# Patient Record
(demographics unavailable — no encounter records)

---

## 2024-10-29 NOTE — ASSESSMENT
[FreeTextEntry1] : 71 y/o female with PMHx PE on Eliquis, AFib, HTN, Hypothyroidism, PreDM, AS s/p TAVR (2/2024) presenting to clinic today for a follow up visit for the evaluation of cough. Today pt c/o of SOB for 2 weeks a/w the cough. The patient was last seen in july, 2024 for the cough and was started on Flonase.   #Cgronic cough  #Cough variant asthma -Now a/w SOB - likely from PND vs GERD vs Asthma - started on Flonase -PFT reviewed from 02/2024 - as the symptoms have not improved, will start on Albuterol inhalers prn  #Hx of DVT #Hx of Massive PE S/P TPA & Thrombectomy (2022) on Eliquis #Hx of Mild Pulmonary Hypertension #Hx of AFib on Rate Control and Eliquis - CT Chest (4/8/24) revealed decreasing pleural effusion on the left compared to prior from 3/26/24 - XR Chest (4/11/24) revealed stable left sided opacities and elevated left hemidiaphragm - following with cardiology - Pt is on Lasix 40mg QD - C/w Eliquis 5mg BID (Plan to continue same dose lifelong per Heme)  RTC in 3 months/PRN.

## 2024-10-29 NOTE — HISTORY OF PRESENT ILLNESS
[Former] : former [TextBox_4] : 71 y/o female with PMHx PE on Eliquis, AFib, HTN, Hypothyroidism, PreDM, AS s/p TAVR (2/2024) presenting to clinic today for a follow up visit for the evaluation of cough. Today pt c/o of SOB for 2 weeks a/w the cough. The patient was last seen in july, 2024 for the cough and was started on Flonase. The patient is also on Omeprazole for GERD.

## 2024-10-29 NOTE — PHYSICAL EXAM
[No Resp Distress] : no resp distress [Clear to Auscultation Bilaterally] : clear to auscultation bilaterally [No Acute Distress] : no acute distress [Normal Oropharynx] : normal oropharynx [Normal Rate/Rhythm] : normal rate/rhythm [Normal S1, S2] : normal s1, s2 [No Clubbing] : no clubbing [Oriented x3] : oriented x3

## 2024-10-29 NOTE — REVIEW OF SYSTEMS
[Cough] : cough [Negative] : Constitutional [Dyspnea] : dyspnea [Sputum] : no sputum [Wheezing] : no wheezing [SOB on Exertion] : no sob on exertion

## 2024-11-11 NOTE — REASON FOR VISIT
[FreeTextEntry1] : Presents with son.  Proceeding with hip replacement next month.  Lost about 15 pounds since last office visit, however, reports lower extremity edema a bit worse this week.  Breathing relatively comfortable.  No interval cardiac symptoms.

## 2024-11-11 NOTE — DISCUSSION/SUMMARY
[EKG obtained to assist in diagnosis and management of assessed problem(s)] : EKG obtained to assist in diagnosis and management of assessed problem(s) [FreeTextEntry1] : Double Torsemide x 3-days, then resume 20mg daily Continue Eliquis Continue diltiazem Follow-up 4 months

## 2024-11-11 NOTE — PHYSICAL EXAM
[de-identified] : Obese, no distress [de-identified] : Irregularly irregular, normal S1-S2, 2/6 systolic ejection murmur, no rub or gallop [de-identified] : Decreased left lower breath sounds, otherwise clear [de-identified] : Warm, 1-2+ [de-identified] : Alert, normal affect, logical conversation

## 2024-11-11 NOTE — ASSESSMENT
[FreeTextEntry1] : Severe bicuspid AS / aortopathy s/p SAVR (23 mm Honeycutt Inspiris) + ascending aortic aneurysm repair. Normal bioprosthetic AV function  Chronic diastolic CHF Chronic volume overload improving (obesity / sedentary / venous insufficiency contributing to LE edema)  Rate controlled [id number] = 4  Recurrent DVT / PE (post-op)  Pericardial effusion (post-op / improved)  BP controlled

## 2024-11-19 NOTE — ASSESSMENT
[FreeTextEntry1] : #Preop clearance visit - can bridge eliquis with lovenox and stop 12hrs preop RCRI:  Operative risk: Intermediate SP MICHELLE bioprthestitic Patient risk: High risk (bleeding PE and AVR) can continue cardizem for Afib stop SGLT2i if any 3 days pre op stop ACE ARB 1-day preop high perioperative pending PSTevalneeds cardiology risk assessment with dr armenta  #Severe AS SP AVR bioprothesis in Feb 2024 #AAA #HFpEF, Afib - s/p Biobental on 2/20/2024. - On eliquis  - c/w cardizem - c/w torsemide - follows with [facility] Rate controlled [id number] = 4  #Intermittent Rectal bleeding - On eliquis  - BRBPR - strains sometimes - GI: likely hemorrhoidal, pt will f/u with them for endoscopy and will see colorectal surgeon  # DVT and PE May 2022 -c/w eliquis  - had severe PE, went to ER, SP thrombectomy and placed on eliquis since this - can consider stopping  #Lumbar/ Hip degenerative disease #Lumbar radiculopathy #Left sciatic Nerve pain 2/2 chronic back pain #BL knee OA - Chronic back pain with left sided gluteal pain radiating down left leg posterior - Difficulty walking and completing ADLs. Uses a walker - Seeing pain management. S/p multiple steroid injections. - continue gabapentin 300 mg BID - C/w treatment with pain management and ortho - avoid NSAIDs - Tylenol PRN for pain - planned for BL hip replacements with ortho   #DM - pre DM range now - follow every 3-6 months - counseled on low carb diet  # pending PST for surgery, not clearly clear by cardiology yet will revise post PST

## 2024-11-19 NOTE — HISTORY OF PRESENT ILLNESS
[FreeTextEntry1] : Preop clearannce [de-identified] : 71-year F with a PMHx of Afib on Eliquis PE (thrombectomy)/ DVT (RLE) 5/2022 on eliquis, HTN, spinal stenosis, pulmonary HTN, OA of bilateral hips, Ascending Aortic aneurysm (4.2cm), Severe AS s/p AVR bioprosthetic , GERD presents for follow up. She complains of acid reflux and dry cough.  scheduled for left hip replacement by [facility] in mid December for preop clearance

## 2024-11-19 NOTE — PHYSICAL EXAM
[No Acute Distress] : no acute distress [Well Nourished] : well nourished [Normal Sclera/Conjunctiva] : normal sclera/conjunctiva [Normal Outer Ear/Nose] : the outer ears and nose were normal in appearance [No JVD] : no jugular venous distention [No Lymphadenopathy] : no lymphadenopathy [Supple] : supple [No Respiratory Distress] : no respiratory distress  [No Accessory Muscle Use] : no accessory muscle use [Normal Rate] : normal rate  [Regular Rhythm] : with a regular rhythm [Soft] : abdomen soft [Non Tender] : non-tender [No HSM] : no HSM [Normal Posterior Cervical Nodes] : no posterior cervical lymphadenopathy [Normal Anterior Cervical Nodes] : no anterior cervical lymphadenopathy [No CVA Tenderness] : no CVA  tenderness [No Spinal Tenderness] : no spinal tenderness [No Joint Swelling] : no joint swelling [Grossly Normal Strength/Tone] : grossly normal strength/tone [No Rash] : no rash [Coordination Grossly Intact] : coordination grossly intact [Normal Affect] : the affect was normal [Normal Insight/Judgement] : insight and judgment were intact [de-identified] : Decreased left BS [de-identified] : Metallic s2 [de-identified] : Walking with a walker, abnormal gait Severe OA [de-identified] : LE edema

## 2024-11-19 NOTE — REVIEW OF SYSTEMS
[Lower Ext Edema] : lower extremity edema [Cough] : cough [Joint Pain] : joint pain [Muscle Pain] : muscle pain [Negative] : Genitourinary [Fever] : no fever [Chills] : no chills [Discharge] : no discharge [Vision Problems] : no vision problems [Earache] : no earache [Nasal Discharge] : no nasal discharge [Chest Pain] : no chest pain [Palpitations] : no palpitations [Orthopnea] : no orthopnea [Abdominal Pain] : no abdominal pain [Nausea] : no nausea [Constipation] : no constipation [Diarrhea] : diarrhea [Vomiting] : no vomiting [Dysuria] : no dysuria [Hematuria] : no hematuria [Headache] : no headache [Memory Loss] : no memory loss [FreeTextEntry7] : BRBPR

## 2025-01-13 NOTE — HISTORY OF PRESENT ILLNESS
[FreeTextEntry1] : Patient is a 71-year-old female with a PMHx of HTN, diabetes, DVT, PD on Eliquis, spinal stenosis, pulmonary hypertension, osteoarthritis of the hips, ascending aortic aneurysm, aortic stenosis s/p aortic valve repairwho presents for evaluation of sigmoid colon cancer. After the patient's last office visit, she was admitted to [facility] with bleeding per rectum. She underwent evaluation and was found to have a sigmoid colon mass. Patient had a colonoscopy on December 20th, 2024. This showed multiple polyps, as well as a large mass in the sigmoid colon. Pathology showed tubular adenomas and the mass biopsies came back as invasive, moderately differentiated adenocarcinoma. Patient had a CT chest adenopelvis that showed no evidence of metastatic disease. Patient presents today to discuss surgery. She states overall she feels well, but she continues to have blood in her bowel movements. She has bowel movements one to two times per day. She denies recent fevers, chills, nausea, or vomiting. She denies a family history of colon cancer, rectal cancer, or inflammatory bowel disease, and she has not previously had a colonoscopy.

## 2025-01-13 NOTE — ASSESSMENT
[FreeTextEntry1] : 71-year-old female, sigmoid colon adenocarcinoma.  I spoke to the patient regarding her condition, I recommended robot assisted laparoscopic sigmoidectomy, possible open, possible ostomy. All risk benefits and all terms were discussed, including bleeding, infection, damage to surrounding structures, anastomotic stricture, anastomotic leak, cardiopulmonary events, venous thromboembolic events, hernia formation, neuropathy. We expect a 3 to 7 day hospital stay and 6 to 8 week recovery. Patient expressed understanding and was in agreement with the plan.

## 2025-01-13 NOTE — PHYSICAL EXAM
[FreeTextEntry1] : General: awake, alert, no acute distress. Respiratory: normal respiratory effort. Abdomen: soft, non-tender, non-distended.

## 2025-01-13 NOTE — HISTORY OF PRESENT ILLNESS
[FreeTextEntry1] : Patient is a 71-year-old female with a PMHx of HTN, diabetes, DVT, PD on Eliquis, spinal stenosis, pulmonary hypertension, osteoarthritis of the hips, ascending aortic aneurysm, aortic stenosis s/p aortic valve repairwho presents for evaluation of sigmoid colon cancer. After the patient's last office visit, she was admitted to [facility] with bleeding per rectum. She underwent evaluation and was found to have a sigmoid colon mass. Patient had a colonoscopy on December 20th, 2024. This showed multiple polyps, as well as a large mass in the sigmoid colon. Pathology showed tubular adenomas and the mass biopsies came back as invasive, moderately differentiated adenocarcinoma. Patient had a CT chest adenopelvis that showed no evidence of metastatic disease. Patient presents today to discuss surgery. She states overall she feels well, but she continues to have blood in her bowel movements. She has bowel movements one to two times per day. She denies recent fevers, chills, nausea, or vomiting. She denies a family history of colon cancer, rectal cancer, or inflammatory bowel disease, and she has not previously had a colonoscopy.

## 2025-01-13 NOTE — END OF VISIT
[FreeTextEntry3] : All medical record entries made by Karla Pina (Scribe) were at my, Dr. Raz Encinas, direction and personally dictated by me on 01/08/2025. I have reviewed the chart and agree that the record accurately reflects my personal performance of the history, physical exam, assessment, and plan. I have also personally directed, reviewed, and agreed with the chart.All medical record entries made by Karla Pina (Scribe) were at , Dr. Arely Smith, direction and personally dictated by me on 01/08/2025. I have reviewed the chart and agree that the record accurately reflects my personal performance of the history, physical exam, assessment, and plan. I have also personally directed, reviewed, and agreed with the chart.

## 2025-02-05 NOTE — ASSESSMENT
[FreeTextEntry1] : 71-year-old female, s/p robotic sigmoidectomy for T3N0 sigmoid adenocarcinoma.   The patient is recovering well from surgery. I recommended she return to a high-fiber diet and light physical activity. We will refer her to Dr. Marie, for evaluation. We will see her back in 3 months.

## 2025-02-05 NOTE — HISTORY OF PRESENT ILLNESS
[FreeTextEntry1] : Patient is a 71-year-old female with a PMHx of HTN, diabetes, DVT, PD on Eliquis, spinal stenosis, pulmonary hypertension, osteoarthritis of the hips, ascending aortic aneurysm, aortic stenosis s/p aortic valve repair who presents for a post-operative office visit, s/p robotic sigmoidectomy on January 17, 2025. Pathology showed T3N0 (0/18) moderately differentiated adenocarcinoma. Since the procedure, the patient states she feels well. She's tolerating a diet with regular bowel movements. She reports mild abdominal soreness. She denies recent fevers, chills, nausea, or vomiting. She denies a family history of colon cancer, rectal cancer, or inflammatory bowel disease, and she has not previously had a colonoscopy.     Previous History: After the patient's last office visit, she was admitted to [facility] with bleeding per rectum. She underwent evaluation and was found to have a sigmoid colon mass. Patient had a colonoscopy on December 20th, 2024. This showed multiple polyps, as well as a large mass in the sigmoid colon. Pathology showed tubular adenomas and the mass biopsies came back as invasive, moderately differentiated adenocarcinoma. Patient had a CT chest, abdomen, and pelvis that showed no evidence of metastatic disease.

## 2025-02-05 NOTE — END OF VISIT
[FreeTextEntry3] : Documented by Hoa Brown acting as a scribe for Raz Encinas on 02/04/2025   All medical record entries made by the Scribe were at my, Dr. Raz Encinas direction and personally dictated by me on 02/04/2025 . I have reviewed the chart and agree that the record accurately reflects my personal performance of the history, physical exam, assessment and plan. I have also personally directed, reviewed, and agreed with the chart.

## 2025-02-05 NOTE — CONSULT LETTER
[Dear  ___] : Dear  [unfilled], [Consult Letter:] : I had the pleasure of evaluating your patient, [unfilled]. [FreeTextEntry1] : Sincerely,  Raz Encinas MD, Colon and Rectal Surgery Director of Colon and Rectal Surgery  [employer] at [address], [address] [place] [address] Tel [phone] ext [phone] Fax [phone]

## 2025-02-05 NOTE — CONSULT LETTER
[Dear  ___] : Dear  [unfilled], [Consult Letter:] : I had the pleasure of evaluating your patient, [unfilled]. [FreeTextEntry1] : Sincerely,  Raz Encinas MD, Colon and Rectal Surgery Director of Colon and Rectal Surgery  [employer] at [address], [address] [place] [address] Tel [phone] ext [phone] Fax [phone]

## 2025-02-05 NOTE — HISTORY OF PRESENT ILLNESS
[FreeTextEntry1] : Patient is a 71-year-old female with a PMHx of HTN, diabetes, DVT, PD on Eliquis, spinal stenosis, pulmonary hypertension, osteoarthritis of the hips, ascending aortic aneurysm, aortic stenosis s/p aortic valve repair who presents for a post-operative office visit, s/p robotic sigmoidectomy on January 17, 2025. Pathology showed T3N0 (0/18) moderately differentiated adenocarcinoma. Since the procedure, the patient states she feels well. She's tolerating a diet with regular bowel movements. She reports mild abdominal soreness. She denies recent fevers, chills, nausea, or vomiting. She denies a family history of colon cancer, rectal cancer, or inflammatory bowel disease, and she has not previously had a colonoscopy.     Previous History: After the patient's last office visit, she was admitted to [facility] with bleeding per rectum. She underwent evaluation and was found to have a sigmoid colon mass. Patient had a colonoscopy on December 20th, 2024. This showed multiple polyps, as well as a large mass in the sigmoid colon. Pathology showed tubular adenomas and the mass biopsies came back as invasive, moderately differentiated adenocarcinoma. Patient had a CT chest, abdomen, and pelvis that showed no evidence of metastatic disease.

## 2025-02-05 NOTE — ADDENDUM
[FreeTextEntry1] :  I, Hoa Brown ([facility]) assisted in filling out this chart under the dictation of Raz Encinas on 02/04/2025

## 2025-02-05 NOTE — PHYSICAL EXAM
[FreeTextEntry1] : General: Awake, Alert, No Acute Distress Respiratory: Normal Respiratory Effort Abdomen: Soft, non-tender, non-distended, well-healing port sites, and pfannenstiel incision. No evidence of erythema, induration, or prurulence.

## 2025-02-19 NOTE — PHYSICAL EXAM
[Ambulatory and capable of all self care but unable to carry out any work activities] : Status 2- Ambulatory and capable of all self care but unable to carry out any work activities. Up and about more than 50% of waking hours [Normal] : affect appropriate [de-identified] : s/p surgery,well healed surgical incisions

## 2025-02-19 NOTE — END OF VISIT
[FreeTextEntry3] : I was physically present for the key portions of the evaluation and management service provided. I agree with the history and physical, and plan which I have reviewed and edited where appropriate.\par

## 2025-02-19 NOTE — HISTORY OF PRESENT ILLNESS
[de-identified] : 69 Female with spinal stenosis, and HTN diagnosed with massive PE early June 2022. \par  \par  She presented with sob.\par  CTA found pulmonary emboli noted in the bilateral main pulmonary arteries with extension to the bilateral segmental and subsegmental branches. Right heart\par  strain was also noted. \par  \par  tPA was given and sent to IR was intubated in IR suite, IR preformed successful thrombectomy, o2 sats improved after thrombectomy. patient kept intubated and transferred to ICU. Patient awakened in ICU, mental status preserved, extubated and hemodynamically stable.\par  patient had bleeding at puncture site,was given two units of pRBC. \par  The patient also had a LE duplex done which showed acute deep vein thrombosis of the right distal femoral, popliteal, bilateral gastrocnemius sinuses and left\par  peroneal veins. She was started on a heparin drip. \par  \par  she was discharged on eliquis  for  PE. \par  \par  Pt is not very ambulatory d/t hip pain and knee pain and spinal stenosis. Unfortunately she is not being allowed pain meds i.e NSAIDS as she is on AC\par  \par  Does  not have any bleeding.  [de-identified] : 2/9/23 Patient is following up for PE and anemia. She feels well except for bilateral hip and knee pain, on Tylenol as needed. She used to see pain management but was discharged by her MD. Now she is seeing ortho Dr Prado, MRI of the hips was done and was recommended to do PT twice a week, waiting for her schedule to start. She is on Gabapentin 300 mg twice daily and her low back pain and sciatica has improved. She denies shortness of breath, chills, palpitations or bleeding.  She has not done colonoscopy. She is UTD with PCP and gyne Dr Salter. She is compliant with Eliquis 2/18/25 Pt is here for follow up.She was diagnosed with colon cancer in 12/24. Patient had a colonoscopy on December 20th, 2024. This showed multiple polyps, as well as a large mass in the sigmoid colon. Pathology showed tubular adenomas and the mass biopsies came back as invasive, moderately differentiated adenocarcinoma. Patient had a CT chest, abdomen, and pelvis that showed no evidence of metastatic disease. She underwent  robotic sigmoidectomy on January 17, 2025. Pathology showed T3N0 (0/18) moderately differentiated adenocarcinoma.  Pt denies any post op complications. states she recd blood transfusions and IV iron while hospitalized.  Since the procedure, the patient states she feels well. She's tolerating a diet with regular bowel movements.. She denies recent fevers, chills, nausea, or vomiting. She denies a family history of colon cancer, rectal cancer, or inflammatory bowel disease, and she has not previously had a colonoscopy.

## 2025-02-19 NOTE — ASSESSMENT
[FreeTextEntry1] : In summary this is a 71 year old woman with obesity, osteoarthritis, symptomatic massive PE and DVT now diagnosed with Stage II [id number] colon cancer s/p sigmoid resection with clear margins   Thrombophilia work up on 1/11/23 was unremarkable.  RECOMMENDATIONS Previous notes reviewed and all relevant laboratory results discussed with and were communicated to the patient. I have reviewed the pathology and staging with pt and her son at length. She has early stage Colon cancer. The only poor prognostic factor noted is LVI. I discussed the adjuvant therapy that could be offered. However with pt's performance status, she will likely not tolerate chemotherapy well. I discussed this at length with them and they were in agreement with proceeding with close surveillance. I have ordered CBC, CMP, CEA, iron profile and ctDNA ( signatera) She will need a repeat colonoscopy in 1 year. Imaging will be ordered in 6 months  #Pulmonary embolism - Continue Eliquis - Discussed with pt that she had a very symptomatic PE and my preference would be continue indefinite AC. In terms of giving her a lower dose, she is obese and again I would not recommend reduction of the Eliquis dose.   #H/o anemia - Will check iron panel and treat with IV iron if needed RTC in 1month

## 2025-02-19 NOTE — REVIEW OF SYSTEMS
[Joint Pain] : joint pain [Joint Stiffness] : joint stiffness [Muscle Pain] : muscle pain [Negative] : Allergic/Immunologic [FreeTextEntry2] : no colonoscopy done, Mammogram in 2021 NEG, GYN eval recently

## 2025-02-19 NOTE — HISTORY OF PRESENT ILLNESS
[de-identified] : 69 Female with spinal stenosis, and HTN diagnosed with massive PE early June 2022. \par  \par  She presented with sob.\par  CTA found pulmonary emboli noted in the bilateral main pulmonary arteries with extension to the bilateral segmental and subsegmental branches. Right heart\par  strain was also noted. \par  \par  tPA was given and sent to IR was intubated in IR suite, IR preformed successful thrombectomy, o2 sats improved after thrombectomy. patient kept intubated and transferred to ICU. Patient awakened in ICU, mental status preserved, extubated and hemodynamically stable.\par  patient had bleeding at puncture site,was given two units of pRBC. \par  The patient also had a LE duplex done which showed acute deep vein thrombosis of the right distal femoral, popliteal, bilateral gastrocnemius sinuses and left\par  peroneal veins. She was started on a heparin drip. \par  \par  she was discharged on eliquis  for  PE. \par  \par  Pt is not very ambulatory d/t hip pain and knee pain and spinal stenosis. Unfortunately she is not being allowed pain meds i.e NSAIDS as she is on AC\par  \par  Does  not have any bleeding.  [de-identified] : 2/9/23 Patient is following up for PE and anemia. She feels well except for bilateral hip and knee pain, on Tylenol as needed. She used to see pain management but was discharged by her MD. Now she is seeing ortho Dr Prado, MRI of the hips was done and was recommended to do PT twice a week, waiting for her schedule to start. She is on Gabapentin 300 mg twice daily and her low back pain and sciatica has improved. She denies shortness of breath, chills, palpitations or bleeding.  She has not done colonoscopy. She is UTD with PCP and gyne Dr Salter. She is compliant with Eliquis 2/18/25 Pt is here for follow up.She was diagnosed with colon cancer in 12/24. Patient had a colonoscopy on December 20th, 2024. This showed multiple polyps, as well as a large mass in the sigmoid colon. Pathology showed tubular adenomas and the mass biopsies came back as invasive, moderately differentiated adenocarcinoma. Patient had a CT chest, abdomen, and pelvis that showed no evidence of metastatic disease. She underwent  robotic sigmoidectomy on January 17, 2025. Pathology showed T3N0 (0/18) moderately differentiated adenocarcinoma.  Pt denies any post op complications. states she recd blood transfusions and IV iron while hospitalized.  Since the procedure, the patient states she feels well. She's tolerating a diet with regular bowel movements.. She denies recent fevers, chills, nausea, or vomiting. She denies a family history of colon cancer, rectal cancer, or inflammatory bowel disease, and she has not previously had a colonoscopy.

## 2025-02-19 NOTE — PHYSICAL EXAM
[Ambulatory and capable of all self care but unable to carry out any work activities] : Status 2- Ambulatory and capable of all self care but unable to carry out any work activities. Up and about more than 50% of waking hours [Normal] : affect appropriate [de-identified] : s/p surgery,well healed surgical incisions

## 2025-02-19 NOTE — ASSESSMENT
[FreeTextEntry1] : In summary this is a 71 year old woman with obesity, osteoarthritis, symptomatic massive PE and DVT now diagnosed with Stage II [id number] colon cancer s/p sigmoid resection with clear margins   Thrombophilia work up on 1/11/23 was unremarkable.  RECOMMENDATIONS Previous notes reviewed and all relevant laboratory results discussed with and were communicated to the patient. I have reviewed the pathology and staging with pt and her son at length. She has early stage Colon cancer. The only poor prognostic factor noted is LVI. I discussed the adjuvant therapy that could be offered. However with pt's performance status, she will likely not tolerate chemotherapy well. I discussed this at length with them and they were in agreement with proceeding with close surveillance. I have ordered CBC, CMP, CEA, iron profile and ctDNA ( signatera) She will need a repeat colonoscopy in 1 year. Imaging will be ordered in 6 months  #Pulmonary embolism - Continue Eliquis - Discussed with pt that she had a very symptomatic PE and my preference would be continue indefinite AC. In terms of giving her a lower dose, she is obese and again I would not recommend reduction of the Eliquis dose.   #H/o anemia - Will check iron panel and treat with IV iron if needed RTC in 1month

## 2025-03-03 NOTE — ASSESSMENT
[FreeTextEntry1] : 71-year F with a PMHx of Afib on Eliquis PE (thrombectomy)/ DVT (RLE) 5/2022 on eliquis, HTN, spinal stenosis, pulmonary HTN, OA of bilateral hips, Ascending Aortic aneurysm (4.2cm), Severe AS s/p AVR bioprosthetic , GERD presents for follow up. Recently diagnosied with Stage II [id number] colon cancer s/p sigmoid resection with clear margins following with Dr. Mayfield.  # Stage II [id number] colon cancer  s/p sigmoid resection with clear margins  Following with Dr. Mayfield. No plans for radiation or chemotherpay at this time  #Preop clearance visit for Bilateral Hip Replacement - can bridge eliquis with lovenox and stop 12hrs preop RCRI:  Operative risk: Intermediate SP MICHELLE bioprthestitic Patient risk: High risk (bleeding PE and AVR) can continue cardizem for Afib stop SGLT2i if any 3 days pre op stop ACE ARB 1-day preop high perioperative pending PSTeval Needs cardiology risk assessment with dr armenta  #Severe AS SP AVR bioprothesis in Feb 2024 #AAA #HFpEF, Afib - s/p Biobental on 2/20/2024. - On eliquis  - c/w cardizem - c/w torsemide - follows with [facility] Rate controlled [id number] = 4  # DVT and PE May 2022 -c/w eliquis  - had severe PE, went to ER, SP thrombectomy and placed on eliquis since this - Given recent cancer diagnosis, will need life long AC  #Lumbar/ Hip degenerative disease #Lumbar radiculopathy #Left sciatic Nerve pain 2/2 chronic back pain #BL knee OA - Chronic back pain with left sided gluteal pain radiating down left leg posterior - Difficulty walking and completing ADLs. Uses a walker - Seeing pain management. S/p multiple steroid injections. - continue gabapentin 300 mg BID - C/w treatment with pain management and ortho - avoid NSAIDs - Tylenol PRN for pain - planned for BL hip replacements with ortho   #DM - pre DM range now - follow every 3-6 months - counseled on low carb diet  # Bilateral Hand Arthritis Bilateral Hand Xray Consider Rhematology Referral if Severe  #HCM Mammorgram Ordered FU in 6 Months and prn

## 2025-03-03 NOTE — PHYSICAL EXAM
[No Acute Distress] : no acute distress [Well Nourished] : well nourished [Normal Sclera/Conjunctiva] : normal sclera/conjunctiva [Normal Outer Ear/Nose] : the outer ears and nose were normal in appearance [No JVD] : no jugular venous distention [No Lymphadenopathy] : no lymphadenopathy [Supple] : supple [No Respiratory Distress] : no respiratory distress  [No Accessory Muscle Use] : no accessory muscle use [Normal Rate] : normal rate  [Regular Rhythm] : with a regular rhythm [Soft] : abdomen soft [Non Tender] : non-tender [No HSM] : no HSM [Normal Posterior Cervical Nodes] : no posterior cervical lymphadenopathy [Normal Anterior Cervical Nodes] : no anterior cervical lymphadenopathy [No CVA Tenderness] : no CVA  tenderness [No Spinal Tenderness] : no spinal tenderness [No Joint Swelling] : no joint swelling [Grossly Normal Strength/Tone] : grossly normal strength/tone [No Rash] : no rash [Coordination Grossly Intact] : coordination grossly intact [Normal Affect] : the affect was normal [Normal Insight/Judgement] : insight and judgment were intact [de-identified] : Decreased left BS [de-identified] : Metallic s2 [de-identified] : LE edema [de-identified] : Walking with a walker, abnormal gait Severe OA

## 2025-03-03 NOTE — HISTORY OF PRESENT ILLNESS
[FreeTextEntry1] : Follow Up [de-identified] : 71-year F with a PMHx of Afib on Eliquis PE (thrombectomy)/ DVT (RLE) 5/2022 on eliquis, HTN, spinal stenosis, pulmonary HTN, OA of bilateral hips, Ascending Aortic aneurysm (4.2cm), Severe AS s/p AVR bioprosthetic , GERD presents for follow up. Recently diagnosied with Stage II [id number] colon cancer s/p sigmoid resection with clear margins following with Dr. Mayfield. Reports today of bilateral pain and decreased ROM and that she would like to get her yearly Mammogram. Denies fever, chills, SOB, chest pain, heart palpitation, constipation and diarheea.

## 2025-03-21 NOTE — HISTORY OF PRESENT ILLNESS
[de-identified] : 69 Female with spinal stenosis, and HTN diagnosed with massive PE early June 2022. \par  \par  She presented with sob.\par  CTA found pulmonary emboli noted in the bilateral main pulmonary arteries with extension to the bilateral segmental and subsegmental branches. Right heart\par  strain was also noted. \par  \par  tPA was given and sent to IR was intubated in IR suite, IR preformed successful thrombectomy, o2 sats improved after thrombectomy. patient kept intubated and transferred to ICU. Patient awakened in ICU, mental status preserved, extubated and hemodynamically stable.\par  patient had bleeding at puncture site,was given two units of pRBC. \par  The patient also had a LE duplex done which showed acute deep vein thrombosis of the right distal femoral, popliteal, bilateral gastrocnemius sinuses and left\par  peroneal veins. She was started on a heparin drip. \par  \par  she was discharged on eliquis  for  PE. \par  \par  Pt is not very ambulatory d/t hip pain and knee pain and spinal stenosis. Unfortunately she is not being allowed pain meds i.e NSAIDS as she is on AC\par  \par  Does  not have any bleeding.  [de-identified] : 2/9/23 Patient is following up for PE and anemia. She feels well except for bilateral hip and knee pain, on Tylenol as needed. She used to see pain management but was discharged by her MD. Now she is seeing ortho Dr Prado, MRI of the hips was done and was recommended to do PT twice a week, waiting for her schedule to start. She is on Gabapentin 300 mg twice daily and her low back pain and sciatica has improved. She denies shortness of breath, chills, palpitations or bleeding.  She has not done colonoscopy. She is UTD with PCP and gyne Dr Salter. She is compliant with Eliquis 2/18/25 Pt is here for follow up.She was diagnosed with colon cancer in 12/24. Patient had a colonoscopy on December 20th, 2024. This showed multiple polyps, as well as a large mass in the sigmoid colon. Pathology showed tubular adenomas and the mass biopsies came back as invasive, moderately differentiated adenocarcinoma. Patient had a CT chest, abdomen, and pelvis that showed no evidence of metastatic disease. She underwent  robotic sigmoidectomy on January 17, 2025. Pathology showed T3N0 (0/18) moderately differentiated adenocarcinoma.   Pt denies any post op complications. states she recd blood transfusions and IV iron while hospitalized.  Since the procedure, the patient states she feels well. She's tolerating a diet with regular bowel movements.. She denies recent fevers, chills, nausea, or vomiting. She denies a family history of colon cancer, rectal cancer, or inflammatory bowel disease, and she has not previously had a colonoscopy.  3/18/25 Pt is here for follow up. No new complaints she is here to discuss test rslts

## 2025-03-21 NOTE — ASSESSMENT
[FreeTextEntry1] : In summary this is a 71 year old woman with obesity, osteoarthritis, symptomatic massive PE and DVT now diagnosed with Stage II [id number] colon cancer s/p sigmoid resection with clear margins   Thrombophilia work up on 1/11/23 was unremarkable.  RECOMMENDATIONS Previous notes reviewed and all relevant laboratory results discussed with and were communicated to the patient. I have reviewed the pathology and staging with pt and her son at length. She has early stage Colon cancer. The only poor prognostic factor noted is LVI. I discussed the adjuvant therapy that could be offered. However with pt's performance status, she will likely not tolerate chemotherapy well. I discussed this at length with them and they were in agreement with proceeding with close surveillance.  I discussed that the results of ctDNA ( signatera) were negative as were CEA. Other lab results were reviewed as well She will need a repeat colonoscopy in 1 year. Imaging will be ordered in 6 months  #Pulmonary embolism - Continue Eliquis - Discussed with pt that she had a very symptomatic PE and my preference would be continue indefinite AC. In terms of giving her a lower dose, she is obese and again I would not recommend reduction of the Eliquis dose.   #H/o anemia - Resolved RTC in 4 month

## 2025-03-25 NOTE — PHYSICAL EXAM
[No Acute Distress] : no acute distress [Normal Oropharynx] : normal oropharynx [Normal Appearance] : normal appearance [No Neck Mass] : no neck mass [Normal Rate/Rhythm] : normal rate/rhythm [Normal S1, S2] : normal s1, s2 [No Resp Distress] : no resp distress [Clear to Auscultation Bilaterally] : clear to auscultation bilaterally [No Abnormalities] : no abnormalities [Benign] : benign [Normal Gait] : normal gait [No Clubbing] : no clubbing [No Edema] : no edema [Normal Color/ Pigmentation] : normal color/ pigmentation [No Focal Deficits] : no focal deficits [Oriented x3] : oriented x3 [Normal Affect] : normal affect

## 2025-03-25 NOTE — REVIEW OF SYSTEMS
[Cough] : cough [Fever] : no fever [Chills] : no chills [Dry Eyes] : no dry eyes [Eye Irritation] : no eye irritation [Sputum] : no sputum [A.M. Dry Mouth] : no a.m. dry mouth [Chest Discomfort] : no chest discomfort [Orthopnea] : no orthopnea [Syncope] : no syncope [Hay Fever] : no hay fever [Nasal Discharge] : no nasal discharge [GERD] : no gerd [Diarrhea] : no diarrhea [Nocturia] : no nocturia [Arthralgias] : no arthralgias [Raynaud] : no raynaud [Headache] : no headache [Dizziness] : no dizziness

## 2025-03-25 NOTE — HISTORY OF PRESENT ILLNESS
[TextBox_4] : 72 y/o female with PMHx PE on Eliquis, AFib, HTN, Hypothyroidism, PreDM, AS s/p TAVR (2/2024) presenting to clinic today for a follow up.   Patient was last seen in clinic 10/24 for evaluation of cough, was started on flonase and albuterol for possible cough variant asthma.   Smoking history?  Today patient is vitally    no acute issues.

## 2025-03-25 NOTE — ASSESSMENT
[FreeTextEntry1] : 70 y/o female with PMHx PE on Eliquis, AFib, HTN, Hypothyroidism, PreDM, AS s/p TAVR (2/2024) presenting to clinic today for a follow up.  #Cgronic cough #Cough variant asthma -Now a/w SOB - likely from PND vs GERD vs Asthma - started on Flonase -PFT reviewed from 02/2024 - as the symptoms have not improved, will start on Albuterol inhalers prn  #Hx of DVT #Hx of Massive PE S/P TPA & Thrombectomy (2022) on Eliquis #Hx of Mild Pulmonary Hypertension #Hx of AFib on Rate Control and Eliquis - CT Chest (4/8/24) revealed decreasing pleural effusion on the left compared to prior from 3/26/24 - XR Chest (4/11/24) revealed stable left sided opacities and elevated left hemidiaphragm - following with cardiology (Dr. Hall) - c/w Torsemide 20mg qd, Cardizem 120mg qd - following with heme/onc (Dr. Mayfield) - continue with Eliquis 5mg bid, likely indefinitely  RTC in 3 months/PRN.

## 2025-04-29 NOTE — ASSESSMENT
[FreeTextEntry1] : 70 y/o female with PMHx HTN, diabetes, DVT, PD on Eliquis, spinal stenosis, pulmonary hypertension, osteoarthritis of the hips, ascending aortic aneurysm, aortic stenosis s/p aortic valve repair who presents for a post-operative office visit, s/p robotic sigmoidoscopy on January 17, 2025. presenting to clinic today for a follow up visit. Patient was here on last visit for cough in October.   #LOU - CT Chest (4/8/24) revealed decreasing pleural effusion on the left compared to prior from 3/26/24 - XR Chest (4/11/24) revealed stable left sided opacities and elevated left hemidiaphragm - pt was recently discharged on 3L oxygen from hospital in january 2025 - oxy sat upon rest 96% - oxy sat upon ambulation: 94-96% upon ambulation - patient does not qualify for home oxygen - Repeat CT chest non con - follow up in 3 months  # Preoperative risk stratification - Ms. GIBBS is a moderate risk patient for a high risk procedure. - Risk is secondary to intrinsic, non-modifiable patient-related factors such as age, prior diagnoses - No further preoperative workup or optimization is indicated - Patient is on indefinite anticoagulation course for life-threatening pulmonary embolus - Recommend interrupting anticoagulation for as little time as is safe per surgical team - Due to age and obesity, recommend empiric post-procedural CPAP - Recommend standard post-operative precautions, including: Incentive spirometry q10 minutes while awake, adequate pain control to allow for deep breathing, OOB and ambulation as early as possible, PT/OT evaluation. - Continue all inhalers including PRN rescue albuterol up to and including the day of the planned procedure, as well as post-procedurally.  #Hx of DVT #Hx of Massive PE S/P TPA & Thrombectomy (2022) on Eliquis #Hx of Mild Pulmonary Hypertension #Hx of AFib on Rate Control and Eliquis - following with cardiology - Pt is on torsemide at home - C/w Eliquis 5mg BID (Plan to continue same dose lifelong per Heme)  RTC in 3 months/PRN.

## 2025-04-29 NOTE — PHYSICAL EXAM
[No Acute Distress] : no acute distress [No Resp Distress] : no resp distress [No Clubbing] : no clubbing [No Edema] : no edema [TextBox_68] : decreased breath sounds in LLL

## 2025-04-29 NOTE — HISTORY OF PRESENT ILLNESS
[Former] : former [TextBox_4] : 72 y/o female with PMHx HTN, diabetes, DVT, PD on Eliquis, spinal stenosis, pulmonary hypertension, osteoarthritis of the hips, ascending aortic aneurysm, aortic stenosis s/p aortic valve repair who presents for a post-operative office visit, s/p robotic sigmoidoscopy on January 17, 2025. presenting to clinic today for a follow up visit. Patient was here on last visit for cough in October.  Patient smoked for couple of years 40+ years ago. Patient reports cough has improved.  Patient requesting portable oxygen device. She has oxygen cylinder and uses 2-3L when she has to go out but its hard for her to carry cylinder.

## 2025-05-19 NOTE — ADDENDUM
[FreeTextEntry1] :  I, Hoa Brown ([facility]) assisted in filling out this chart under the dictation of Raz Encinas on 05/16/2025

## 2025-05-19 NOTE — ADDENDUM
[FreeTextEntry1] :  I, Hoa Brown ([facility]) assisted in filling out this chart under the dictation of Raz Encinas on 05/16/2025

## 2025-05-19 NOTE — PHYSICAL EXAM
[FreeTextEntry1] : General: Awake, Alert, No Acute Distress Respiratory: Normal Respiratory Effort Abdomen: Soft, non-tender, non-distended, well-healed port site and pfannenstiel incision.

## 2025-05-19 NOTE — HISTORY OF PRESENT ILLNESS
[FreeTextEntry1] : Patient is a 71-year-old female with a PMHx of HTN, diabetes, DVT, PD on Eliquis, spinal stenosis, pulmonary hypertension, osteoarthritis of the hips, ascending aortic aneurysm, aortic stenosis s/p aortic valve repair who presents for a post-operative office visit, s/p robotic sigmoidectomy on January 17, 2025. Pathology showed T3N0 (0/18) moderately differentiated adenocarcinoma. Since the patient's last office visit, she states she has returned to her normal state of health. She is tolerating a diet with regular bowel movements. She is currently cared for by Dr. Mayfield and is undergoing surveillance. ctDNA evaluation was negative. Most recent CEA level in February was 2. She denies recent fevers, chills, nausea, or vomiting. She denies a family history of colon cancer, rectal cancer, or inflammatory bowel disease, and she has not previously had a colonoscopy.

## 2025-05-19 NOTE — ASSESSMENT
[FreeTextEntry1] : 71-year-old female, s/p robotic sigmoidectomy for T3N0 sigmoid colon adenocarcinoma.   The patient continues to do well. She will continue with her current diet and physical activity. She will follow up with Dr. Mayfield regarding images and lab work. She will require a colonoscopy in January 2026 with Dr. Juarez. We will see her back in 3 months.

## 2025-05-19 NOTE — END OF VISIT
[FreeTextEntry3] : Documented by Hoa Brown acting as a scribe for Raz Encinas on 05/16/2025   All medical record entries made by the Scribe were at my, Dr. Raz Encinas direction and personally dictated by me on 05/16/2025 . I have reviewed the chart and agree that the record accurately reflects my personal performance of the history, physical exam, assessment and plan. I have also personally directed, reviewed, and agreed with the chart.

## 2025-06-03 NOTE — PHYSICAL EXAM
[No Acute Distress] : no acute distress [Well Nourished] : well nourished [Normal Sclera/Conjunctiva] : normal sclera/conjunctiva [Normal Outer Ear/Nose] : the outer ears and nose were normal in appearance [No JVD] : no jugular venous distention [No Lymphadenopathy] : no lymphadenopathy [Supple] : supple [No Respiratory Distress] : no respiratory distress  [No Accessory Muscle Use] : no accessory muscle use [Normal Rate] : normal rate  [Regular Rhythm] : with a regular rhythm [Soft] : abdomen soft [Non Tender] : non-tender [No HSM] : no HSM [Normal Posterior Cervical Nodes] : no posterior cervical lymphadenopathy [Normal Anterior Cervical Nodes] : no anterior cervical lymphadenopathy [No CVA Tenderness] : no CVA  tenderness [No Spinal Tenderness] : no spinal tenderness [No Joint Swelling] : no joint swelling [Grossly Normal Strength/Tone] : grossly normal strength/tone [No Rash] : no rash [Coordination Grossly Intact] : coordination grossly intact [Normal Affect] : the affect was normal [Normal Insight/Judgement] : insight and judgment were intact [de-identified] : Decreased left BS [de-identified] : Metallic s2 [de-identified] : LE edema [de-identified] : Walking with a walker, abnormal gait Severe OA

## 2025-06-03 NOTE — ASSESSMENT
[FreeTextEntry1] : 71-year F with a PMHx of Afib on Eliquis PE (thrombectomy)/ DVT (RLE) 5/2022 on eliquis, HTN, spinal stenosis, pulmonary HTN, OA of bilateral hips, Ascending Aortic aneurysm (4.2cm), Severe AS s/p AVR bioprosthetic , GERD presents for follow up. Recently diagnosied with Stage II [id number] colon cancer s/p sigmoid resection with clear margins following with Dr. Mayfield.  # Acute ischemic strokes in right frontal lobe - c/w ASA and Eliquis, Atorvastatin  - f/u Neuro stroke f/u  - /71 today   # Incidental Thyroid nodule - Stable 1.6 cm right thyroid lobe hypodense nodule - thyroid US ordered - f/u TSH, T4, T3  # Incidental Pulmonary nodule  - Few right upper lobe sub-centimeter pulmonary nodules, for example, series 12, image 68, 0.3 cm, and series 12, image 21, 0.4 cm - patient was due for repeat CT chest --> f/u results  - f/u pulm   # Stage II [id number] colon cancer  s/p sigmoid resection with clear margins  Following with Dr. Mayfield -->  has appointment in July  No plans for radiation or chemotherpay at this time  # Severe AS SP AVR bioprothesis in Feb 2024 # AAA # HFpEF # Afib on Elqiuis, rate controlled  - s/p Biobental on 2/20/2024. - follows with Ethan --> has appointment this week  - c/w Eliquis, Cardizem, Torsemide  - [id number] = 4  # DVT and PE May 2022 - c/w Eliquis  - had severe PE, went to ER, SP thrombectomy and placed on Eliquis  - Given recent cancer diagnosis, will need life long AC  # Lumbar/ Hip degenerative disease # Lumbar radiculopathy # Left sciatic Nerve pain 2/2 chronic back pain # BL knee OA - Chronic back pain with left sided gluteal pain radiating down left leg posterior - Difficulty walking and completing ADLs. Uses a walker - Seeing pain management. S/p multiple steroid injections. - continue gabapentin 300 mg BID - C/w treatment with pain management and ortho - avoid NSAIDs - Tylenol PRN for pain - planned for BL hip replacements with ortho --> now on hold due to acute CVA   # DM - A1c 6.1%  - counseled on diet and exercise as tolerated  #HCM - BI-RADS Category 1:  Negative - repeat labs  - meds refilled  - f/u 3 months and PRN

## 2025-06-03 NOTE — HISTORY OF PRESENT ILLNESS
[FreeTextEntry1] : Follow Up [de-identified] : 71-year F with a PMHx of Afib on Eliquis PE (thrombectomy)/ DVT (RLE) 5/2022 on eliquis, HTN, spinal stenosis, pulmonary HTN, OA of bilateral hips, Ascending Aortic aneurysm (4.2cm), Severe AS s/p AVR bioprosthetic , GERD presents for follow up. Recently diagnosied with Stage II [id number] colon cancer s/p sigmoid resection with clear margins following with Dr. Mayfield. Patient was recently admitted to the hospital for acute stroke and was discharged on ASA for 21 days. Hip surgery was post-poned for now. Patient denies any complaints and feels back to her baseline after hospital discharge.

## 2025-06-06 NOTE — ADDENDUM
[FreeTextEntry1] : Scheduled for hip replacement.  ECHO (4/2025): Hyperdynamic LV function.  Normal bioprosthetic aortic valve function (mean gradient 14 mmHg).  Borderline pulmonary hypertension.  Intermediate to high risk patient for perioperative cardiac events. Intermediate risk surgery. No cardiac decompensation.  No further cardiac testing required prior to hip replacement.  Continue Cardizem perioperatively. Maintain adequate intravascular volume status given hyperdynamic LV function.  Note, Bessie is on anticoagulation secondary to a history of DVT/PE in addition to atrial fibrillation.  She was also recently treated for colon cancer.  From a strictly cardiac perspective (atrial fibrillation), Eliquis may be held 2 days prior to procedure and resumed after without bridge.  Will defer definitive perioperative anticoagulation recommendations to hematology given Ross's history of venous thromboembolism and malignancy.

## 2025-06-06 NOTE — ASSESSMENT
[FreeTextEntry1] : Severe bicuspid AS / aortopathy s/p SAVR (23 mm Honeycutt Inspiris) + ascending aortic aneurysm repair. Normal bioprosthetic AV function  Chronic diastolic CHF Relatively compensated (mild chronic volume overload multifactorial - obesity / sedentary / venous insufficiency)  Rate controlled [id number] = 6 (CVA)  Recurrent DVT / PE (post-op)  Pericardial effusion (post-op / improved)  BP controlled

## 2025-06-06 NOTE — REASON FOR VISIT
[FreeTextEntry1] : Interval hospitalization.  Acute ischemic right frontal CVA.  Transient left hemiparesis.  Complete recovery without intervention.  Aspirin added to Eliquis.  Has neurology follow-up.  ECHO (5/2025): Normal LV function.  Normal bioprosthetic aortic valve function.  Mild MR.  Moderate TR.  Feels well since discharge.  No interval cardiac symptoms.

## 2025-06-06 NOTE — PHYSICAL EXAM
[de-identified] : Obese, no distress [de-identified] : Alert, normal affect, logical conversation

## 2025-06-06 NOTE — DISCUSSION/SUMMARY
[FreeTextEntry1] : Continue Torsemide Continue ASA Continue Eliquis Continue diltiazem Continue Lipitor Neurology follow-up Follow-up 4 months [EKG obtained to assist in diagnosis and management of assessed problem(s)] : EKG obtained to assist in diagnosis and management of assessed problem(s)

## 2025-06-13 NOTE — DISCUSSION/SUMMARY
[de-identified] : Impression: Bilateral knee osteoarthritis   Plan: Synvisc One injection to the knees bilaterally, patient tolerated well.  Follow-up: 6 months

## 2025-06-13 NOTE — PROCEDURE
[Large Joint Injection] : Large joint injection [Bilateral] : bilaterally of the [Pain] : pain [X-ray evidence of Osteoarthritis on this or prior visit] : x-ray evidence of Osteoarthritis on this or prior visit [Repeat series performed] : repeat series performed [Alcohol] : alcohol [Betadine] : betadine [Ethyl Chloride sprayed topically] : ethyl chloride sprayed topically [Sterile technique used] : sterile technique used [Synvisc-one (48mg)] : 48mg of Synvisc-one [] : Patient tolerated procedure well

## 2025-06-25 NOTE — DISCUSSION/SUMMARY
[FreeTextEntry1] : Pt is a 72 yo F with PMhx of afib on eliquis, PE , s/p TAVR 2024, s/p AICD, ascending aortic aneurysm repair 2023, colon adenocarcinoma s/p resection 2025, who presents for hospital f/u.  # Ischemic strokes in right frontal lobe, right centrum semiovale and left , etiology possible cardioembolic, granted endorsed compliance with AC. CTA head/neck without flow limiting stenosis. LDL was 94, improved to 45, A1c 6.1. NIHSS 0, mRS 2. - Continue eliquis 5mg BID - Continue atorvastatin 40mg QHS - Start Mg daily to help with muscle cramps - Pt is medium risk for hip replacement surgery from stroke perspective. Recommend waiting 3 mo from time of stroke (25). Would also recommend therapeutic lovenox if/when eliquis needs to be held pre-surgery.   RTC in 6  mo [Goals and Counseling] : I have reviewed the goals of stroke risk factor modification. I counseled the patient on measures to reduce stroke risk, including the importance of medication compliance, risk factor control, exercise, healthy diet and avoidance of smoking. I reviewed stroke warning signs and symptoms and appropriate actions to take if such occur.

## 2025-06-25 NOTE — HISTORY OF PRESENT ILLNESS
[FreeTextEntry1] : Pt is a 70 yo F with PMhx of afib on eliquis, PE 2022, s/p TAVR 02/2024, s/p AICD, ascending aortic aneurysm repair 05/2023, colon adenocarcinoma s/p resection 03/2025, who presents for hospital f/u. Pt presented on 5/21/25 with left arm weakness, found to have b/l strokes in right frontal lobe, right centrum semiovale and left BG, ASA was added to home eliquis for 3 weeks, she is no longer on ASA. Endorses improvement/resolution of LUE weakness. Pt ambulates with a walker due to chronic hip pain/mobility issue. She is independent with ADL's., lives in downstairs apartment from her son. Denies falls, bleeding/bruising. Possible hip surgery in 1-2 mo. Endorses some muscle cramps in legs.

## 2025-06-25 NOTE — PHYSICAL EXAM
[General Appearance - Alert] : alert [General Appearance - In No Acute Distress] : in no acute distress [General Appearance - Well Nourished] : well nourished [General Appearance - Well Developed] : well developed [Oriented To Time, Place, And Person] : oriented to person, place, and time [Affect] : the affect was normal [Person] : oriented to person [Place] : oriented to place [Time] : oriented to time [Fluency] : fluency intact [Comprehension] : comprehension intact [Cranial Nerves Optic (II)] : visual acuity intact bilaterally,  visual fields full to confrontation, pupils equal round and reactive to light [Cranial Nerves Oculomotor (III)] : extraocular motion intact [Cranial Nerves Trigeminal (V)] : facial sensation intact symmetrically [Cranial Nerves Facial (VII)] : face symmetrical [Cranial Nerves Vestibulocochlear (VIII)] : hearing was intact bilaterally [Cranial Nerves Hypoglossal (XII)] : there was no tongue deviation with protrusion [Motor Tone] : muscle tone was normal in all four extremities [Motor Strength] : muscle strength was normal in all four extremities [Sensation Tactile Decrease] : light touch was intact [Coordination - Dysmetria Impaired Finger-to-Nose Bilateral] : not present [FreeTextEntry6] : mildly pain limited at b/l shoulder and hips

## 2025-07-15 NOTE — HISTORY OF PRESENT ILLNESS
[de-identified] : 70YO female presents for initial evaluation for: thyroid evaluation. Reports that a couple of months ago she had a mini stroke. Imaging performed of Brain. Seen two nodules on thyroid. U/S Thyroid 6/29/25. Denies pain or discomfort. No biopsy taken. No trouble swallowing or throat discomfort.. No recent weight loss. On blood thinners.  No further complaints or concerns.

## 2025-07-29 NOTE — PHYSICAL EXAM
[No Acute Distress] : no acute distress [No Resp Distress] : no resp distress [No Clubbing] : no clubbing [No Edema] : no edema [Normal Oropharynx] : normal oropharynx [Normal Appearance] : normal appearance [Normal Rate/Rhythm] : normal rate/rhythm [Normal S1, S2] : normal s1, s2 [Clear to Auscultation Bilaterally] : clear to auscultation bilaterally [Benign] : benign [Normal Color/ Pigmentation] : normal color/ pigmentation [No Focal Deficits] : no focal deficits [Oriented x3] : oriented x3 [TextBox_68] : decreased breath sounds in LLL

## 2025-07-29 NOTE — ASSESSMENT
[FreeTextEntry1] : In summary this is a 71 year old woman with obesity, osteoarthritis, symptomatic massive PE and DVT now diagnosed with Stage II [id number] colon cancer s/p sigmoid resection with clear margins   Thrombophilia work up on 1/11/23 was unremarkable.  RECOMMENDATIONS Previous notes reviewed and all relevant laboratory results discussed with and were communicated to the patient. I have reviewed the pathology and staging with pt and her son at length. She has early stage Colon cancer. The only poor prognostic factor noted is LVI. I discussed the adjuvant therapy that could be offered. However with pt's performance status, she will likely not tolerate chemotherapy well. I discussed this at length with them and they were in agreement with proceeding with close surveillance.  I discussed that the results of ctDNA ( signatera) were negative as were CEA. Other lab results were reviewed as well She will need a repeat colonoscopy in 1 year. CT C/A/P due in 12/2025, scripts given to patient   #Pulmonary embolism - Continue Eliquis - Discussed with pt that she had a very symptomatic PE and my preference would be continue indefinite AC. In terms of giving her a lower dose, she is obese and again I would not recommend reduction of the Eliquis dose.  #H/o anemia - Resolved  Is scheduled for thyroid biopsy in 8/20/25  RTC in 4 months

## 2025-07-29 NOTE — ASSESSMENT
[FreeTextEntry1] : In summary this is a 71 year old woman with obesity, osteoarthritis, symptomatic massive PE and DVT now diagnosed with Stage II [id number] colon cancer s/p sigmoid resection with clear margins   Thrombophilia work up on 1/11/23 was unremarkable.  RECOMMENDATIONS Previous notes reviewed and all relevant laboratory results discussed with and were communicated to the patient. I have reviewed the pathology and staging with pt and her son at length. She has early stage Colon cancer. The only poor prognostic factor noted is LVI. I discussed the adjuvant therapy that could be offered. However with pt's performance status, she will likely not tolerate chemotherapy well. I discussed this at length with them and they were in agreement with proceeding with close surveillance.  I discussed that the results of ctDNA ( signatera) were negative as were CEA. Other lab results were reviewed as well She will need a repeat colonoscopy in 1 year. CT C/A/P due in 12/2025, scripts given to patient   #Pulmonary embolism - Continue Eliquis - Discussed with pt that she had a very symptomatic PE and my preference would be continue indefinite AC. In terms of giving her a lower dose, she is obese and again I would not recommend reduction of the Eliquis dose.  #H/o anemia - Resolved  Is scheduled for thyroid biopsy in 8/20/25  RTC in 4 months

## 2025-07-29 NOTE — HISTORY OF PRESENT ILLNESS
[de-identified] : 69 Female with spinal stenosis, and HTN diagnosed with massive PE early June 2022. \par  \par  She presented with sob.\par  CTA found pulmonary emboli noted in the bilateral main pulmonary arteries with extension to the bilateral segmental and subsegmental branches. Right heart\par  strain was also noted. \par  \par  tPA was given and sent to IR was intubated in IR suite, IR preformed successful thrombectomy, o2 sats improved after thrombectomy. patient kept intubated and transferred to ICU. Patient awakened in ICU, mental status preserved, extubated and hemodynamically stable.\par  patient had bleeding at puncture site,was given two units of pRBC. \par  The patient also had a LE duplex done which showed acute deep vein thrombosis of the right distal femoral, popliteal, bilateral gastrocnemius sinuses and left\par  peroneal veins. She was started on a heparin drip. \par  \par  she was discharged on eliquis  for  PE. \par  \par  Pt is not very ambulatory d/t hip pain and knee pain and spinal stenosis. Unfortunately she is not being allowed pain meds i.e NSAIDS as she is on AC\par  \par  Does  not have any bleeding.  [Disease: _____________________] : Disease: [unfilled] [T: ___] : T[unfilled] [N: ___] : N[unfilled] [M: ___] : M[unfilled] [AJCC Stage: ____] : AJCC Stage: [unfilled] [de-identified] : 2/9/23 Patient is following up for PE and anemia. She feels well except for bilateral hip and knee pain, on Tylenol as needed. She used to see pain management but was discharged by her MD. Now she is seeing ortho Dr Prado, MRI of the hips was done and was recommended to do PT twice a week, waiting for her schedule to start. She is on Gabapentin 300 mg twice daily and her low back pain and sciatica has improved. She denies shortness of breath, chills, palpitations or bleeding.  She has not done colonoscopy. She is UTD with PCP and gyne Dr Salter. She is compliant with Eliquis 2/18/25 Pt is here for follow up.She was diagnosed with colon cancer in 12/24. Patient had a colonoscopy on December 20th, 2024. This showed multiple polyps, as well as a large mass in the sigmoid colon. Pathology showed tubular adenomas and the mass biopsies came back as invasive, moderately differentiated adenocarcinoma. Patient had a CT chest, abdomen, and pelvis that showed no evidence of metastatic disease. She underwent  robotic sigmoidectomy on January 17, 2025. Pathology showed T3N0 (0/18) moderately differentiated adenocarcinoma.   Pt denies any post op complications. states she recd blood transfusions and IV iron while hospitalized.  Since the procedure, the patient states she feels well. She's tolerating a diet with regular bowel movements.. She denies recent fevers, chills, nausea, or vomiting. She denies a family history of colon cancer, rectal cancer, or inflammatory bowel disease, and she has not previously had a colonoscopy.  3/18/25 Pt is here for follow up. No new complaints she is here to discuss test rslts  7/29/25  Patient is here for follow up for PE and anemia   Patient states feeling well today Denies any changes in her bowel movements, melena, or bloody stools

## 2025-07-29 NOTE — HISTORY OF PRESENT ILLNESS
[Former] : former [TextBox_4] : 72 y/o female with PMHx HTN, diabetes, DVT, PD on Eliquis, spinal stenosis, pulmonary hypertension, osteoarthritis of the hips, ascending aortic aneurysm, aortic stenosis s/p aortic valve repair who presents for a post-operative office visit, s/p robotic sigmoidoscopy on January 17, 2025. presenting to clinic today for a follow up visit.  Patient smoked for couple of years 9 2-3 years ). Has not smoked for 40+ years ago.   She was following pulmonary for LOU and preop risk stratification. She was requesting home oxygen but did not qualify for it.  Recently had a TIA/Stroke event 2 months back.  Has dyspea while walking ; uses a rolling walker

## 2025-07-29 NOTE — REVIEW OF SYSTEMS
Yes, there is a location in [place] and another on [place].     [facility]: [phone]   [facility]: [phone]    Spoke with patient. Sending locations and phone numbers via portal for patient to reach out and schedule.     [Cough] : cough [SOB on Exertion] : sob on exertion [GERD] : gerd [Negative] : Genitourinary [Fever] : no fever [Fatigue] : no fatigue [Chills] : no chills [Poor Appetite] : no poor appetite [Dry Eyes] : no dry eyes [Ear Disturbance] : no ear disturbance [Eye Irritation] : no eye irritation [Nasal Congestion] : no nasal congestion [Sinus Problems] : no sinus problems [Mouth Ulcers] : no mouth ulcers [Chest Discomfort] : no chest discomfort [Edema] : no edema [Orthopnea] : no orthopnea [Palpitations] : no palpitations [Syncope] : no syncope [TextBox_30] : Chronic cough  [TextBox_94] : Functional deconditoned

## 2025-07-29 NOTE — HISTORY OF PRESENT ILLNESS
[de-identified] : 69 Female with spinal stenosis, and HTN diagnosed with massive PE early June 2022. \par  \par  She presented with sob.\par  CTA found pulmonary emboli noted in the bilateral main pulmonary arteries with extension to the bilateral segmental and subsegmental branches. Right heart\par  strain was also noted. \par  \par  tPA was given and sent to IR was intubated in IR suite, IR preformed successful thrombectomy, o2 sats improved after thrombectomy. patient kept intubated and transferred to ICU. Patient awakened in ICU, mental status preserved, extubated and hemodynamically stable.\par  patient had bleeding at puncture site,was given two units of pRBC. \par  The patient also had a LE duplex done which showed acute deep vein thrombosis of the right distal femoral, popliteal, bilateral gastrocnemius sinuses and left\par  peroneal veins. She was started on a heparin drip. \par  \par  she was discharged on eliquis  for  PE. \par  \par  Pt is not very ambulatory d/t hip pain and knee pain and spinal stenosis. Unfortunately she is not being allowed pain meds i.e NSAIDS as she is on AC\par  \par  Does  not have any bleeding.  [Disease: _____________________] : Disease: [unfilled] [T: ___] : T[unfilled] [N: ___] : N[unfilled] [M: ___] : M[unfilled] [AJCC Stage: ____] : AJCC Stage: [unfilled] [de-identified] : 2/9/23 Patient is following up for PE and anemia. She feels well except for bilateral hip and knee pain, on Tylenol as needed. She used to see pain management but was discharged by her MD. Now she is seeing ortho Dr Prado, MRI of the hips was done and was recommended to do PT twice a week, waiting for her schedule to start. She is on Gabapentin 300 mg twice daily and her low back pain and sciatica has improved. She denies shortness of breath, chills, palpitations or bleeding.  She has not done colonoscopy. She is UTD with PCP and gyne Dr Salter. She is compliant with Eliquis 2/18/25 Pt is here for follow up.She was diagnosed with colon cancer in 12/24. Patient had a colonoscopy on December 20th, 2024. This showed multiple polyps, as well as a large mass in the sigmoid colon. Pathology showed tubular adenomas and the mass biopsies came back as invasive, moderately differentiated adenocarcinoma. Patient had a CT chest, abdomen, and pelvis that showed no evidence of metastatic disease. She underwent  robotic sigmoidectomy on January 17, 2025. Pathology showed T3N0 (0/18) moderately differentiated adenocarcinoma.   Pt denies any post op complications. states she recd blood transfusions and IV iron while hospitalized.  Since the procedure, the patient states she feels well. She's tolerating a diet with regular bowel movements.. She denies recent fevers, chills, nausea, or vomiting. She denies a family history of colon cancer, rectal cancer, or inflammatory bowel disease, and she has not previously had a colonoscopy.  3/18/25 Pt is here for follow up. No new complaints she is here to discuss test rslts  7/29/25  Patient is here for follow up for PE and anemia   Patient states feeling well today Denies any changes in her bowel movements, melena, or bloody stools

## 2025-07-29 NOTE — ASSESSMENT
[FreeTextEntry1] : 72 y/o female with PMHx HTN, diabetes, DVT, PD on Eliquis, spinal stenosis, pulmonary hypertension, osteoarthritis of the hips, ascending aortic aneurysm, aortic stenosis s/p aortic valve repair who presents for a post-operative office visit, s/p robotic sigmoidoscopy on January 17, 2025. presenting to clinic today for a follow up visit. Patient was here on last visit for cough in October.   #Dyspnea on Exertion:  # Stable Pulmonary Nodule  - CT Chest (4/8/24) revealed decreasing pleural effusion on the left compared to prior from 3/26/24 - XR Chest (4/11/24) revealed stable left sided opacities and elevated left hemidiaphragm - CT chest  ( 06/29/2025): Post aortic valve surgery/ascending aortic aneurysm repair. Stable ascending aorta. Stable left lower lobe atelectasis secondary to elevated left hemidiaphragm. No parenchymal mass, pleural effusions or pneumothorax.Discoid atelectasis/scarring anterior right upper lobe, 303-100 . Stable osteophyte induced fibrosis medial right lower lobe, 303-106. Stable micronodule, right upper lobe, 303-74 - Stable nodule >> does not need follow up for nodule.  - oxy sat upon rest 96% and oxy sat upon ambulation: 94-96% upon ambulation; patient does not qualify for home oxygen (test performed last visit, 2025)   # Preoperative risk stratification - Ms. GIBBS is a moderate risk patient for a high risk procedure. - Risk is secondary to intrinsic, non-modifiable patient-related factors such as age, prior diagnoses - No further preoperative workup or optimization is indicated - Patient is on indefinite anticoagulation course for life-threatening pulmonary embolus - Recommend interrupting anticoagulation for as little time as is safe per surgical team - Due to age and obesity, recommend empiric post-procedural CPAP - Recommend standard post-operative precautions, including: Incentive spirometry q10 minutes while awake, adequate pain control to allow for deep breathing, OOB and ambulation as early as possible, PT/OT evaluation. - Continue all inhalers including PRN rescue albuterol up to and including the day of the planned procedure, as well as post-procedurally.  #Hx of DVT #Hx of Massive PE S/P TPA & Thrombectomy (2022) on Eliquis #Hx of Mild Pulmonary Hypertension #Hx of AFib on Rate Control and Eliquis - following with cardiology - Pt is on torsemide at home - C/w Eliquis 5mg BID (Plan to continue same dose lifelong per Heme)  RTC in 1 year /PRN